# Patient Record
Sex: FEMALE | Race: OTHER | Employment: STUDENT | URBAN - METROPOLITAN AREA
[De-identification: names, ages, dates, MRNs, and addresses within clinical notes are randomized per-mention and may not be internally consistent; named-entity substitution may affect disease eponyms.]

---
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
## 2024-12-27 ENCOUNTER — OFFICE VISIT (OUTPATIENT)
Age: 24
End: 2024-12-27

## 2024-12-27 VITALS
OXYGEN SATURATION: 98 % | WEIGHT: 159.6 LBS | DIASTOLIC BLOOD PRESSURE: 71 MMHG | SYSTOLIC BLOOD PRESSURE: 111 MMHG | RESPIRATION RATE: 18 BRPM | BODY MASS INDEX: 26.59 KG/M2 | HEART RATE: 72 BPM | HEIGHT: 65 IN | TEMPERATURE: 98 F

## 2024-12-27 DIAGNOSIS — J06.9 VIRAL UPPER RESPIRATORY TRACT INFECTION: Primary | ICD-10-CM

## 2024-12-27 DIAGNOSIS — R05.1 ACUTE COUGH: ICD-10-CM

## 2024-12-27 DIAGNOSIS — J02.9 SORE THROAT: ICD-10-CM

## 2024-12-27 LAB
INFLUENZA A ANTIGEN, POC: NEGATIVE
INFLUENZA B ANTIGEN, POC: NEGATIVE
Lab: NORMAL
PERFORMING INSTRUMENT: NORMAL
QC PASS/FAIL: NORMAL
S PYO AG THROAT QL: NORMAL
SARS-COV-2, POC: NORMAL

## 2024-12-27 RX ORDER — DROSPIRENONE AND ETHINYL ESTRADIOL 0.02-3(28)
KIT ORAL
COMMUNITY

## 2024-12-27 ASSESSMENT — ENCOUNTER SYMPTOMS: COUGH: 1

## 2024-12-27 NOTE — PROGRESS NOTES
2024   Hailey Hurtado (: 2000) is a 24 y.o. female, New patient, here for evaluation of the following chief complaint(s):  Cough (Started kirstie day ), Congestion, Chest Pain (Burning in chest ), Other (Metallic taste when coughing ), and Nasal Congestion     ASSESSMENT/PLAN:  Below is the assessment and plan developed based on review of pertinent history, physical exam, labs, studies, and medications.  1. Viral upper respiratory tract infection  2. Acute cough  -     POCT COVID-19, Antigen  -     POCT Influenza A/B Antigen  3. Sore throat  -     POCT rapid strep A         Handout given with care instructions  2. OTC for symptom management. Increase fluid intake, ensure adequate nutritional intake.  3. Follow up with PCP as needed.  4. Go to ED with development of any acute symptoms.     Follow up:  Return if symptoms worsen or fail to improve.  Follow up immediately for any new, worsening or changes or if symptoms are not improving over the next 5-7 days.     SUBJECTIVE/OBJECTIVE:    Cough  Associated symptoms include chest pain.   Chest Pain   Associated symptoms include a cough.          Cough (Started kirstie day ), Congestion, Chest Pain (Burning in chest ), Other (Metallic taste when coughing ), and Nasal Congestion      Results for orders placed or performed in visit on 24   POCT COVID-19, Antigen   Result Value Ref Range    SARS-COV-2, POC Not-Detected Not Detected    Lot Number [id number]     QC Pass/Fail pass     Performing Instrument BinaxNOW    POCT Influenza A/B Antigen   Result Value Ref Range    Inflenza A Ag negative     Influenza B Ag negative    POCT rapid strep A   Result Value Ref Range    Strep A Ag None Detected None Detected             Review of Systems   HENT:  Positive for congestion.    Respiratory:  Positive for cough.    Cardiovascular:  Positive for chest pain.         Physical Exam  Constitutional:       Appearance: Normal appearance.   HENT:      Head:

## 2024-12-27 NOTE — PATIENT INSTRUCTIONS
- decongestants as needed  - Nasonex or Flonase or Nasacort  - Afrin - no more than 2-3 days - tilt head to side and yawn to help with ear pressure  - Advil or Tylenol-- you can alternate